# Patient Record
Sex: MALE | Race: WHITE | NOT HISPANIC OR LATINO | Employment: UNEMPLOYED | URBAN - METROPOLITAN AREA
[De-identification: names, ages, dates, MRNs, and addresses within clinical notes are randomized per-mention and may not be internally consistent; named-entity substitution may affect disease eponyms.]

---

## 2018-12-06 ENCOUNTER — HOSPITAL ENCOUNTER (OUTPATIENT)
Dept: MEDSURG UNIT | Facility: HOSPITAL | Age: 43
End: 2018-12-10
Attending: INTERNAL MEDICINE | Admitting: INTERNAL MEDICINE

## 2018-12-06 LAB
ABS NEUT (OLG): 14.96 X10(3)/MCL (ref 2.1–9.2)
ALBUMIN SERPL-MCNC: 3.5 GM/DL (ref 3.4–5)
ALBUMIN/GLOB SERPL: 0.9 RATIO (ref 1.1–2)
ALP SERPL-CCNC: 64 UNIT/L (ref 50–136)
ALT SERPL-CCNC: 15 UNIT/L (ref 12–78)
APPEARANCE, UA: CLEAR
AST SERPL-CCNC: 16 UNIT/L (ref 15–37)
BACTERIA SPEC CULT: ABNORMAL /HPF
BASOPHILS # BLD AUTO: 0.1 X10(3)/MCL (ref 0–0.2)
BASOPHILS NFR BLD AUTO: 0 %
BILIRUB SERPL-MCNC: 0.3 MG/DL (ref 0.2–1)
BILIRUB UR QL STRIP: NEGATIVE
BILIRUBIN DIRECT+TOT PNL SERPL-MCNC: 0.1 MG/DL (ref 0–0.5)
BILIRUBIN DIRECT+TOT PNL SERPL-MCNC: 0.2 MG/DL (ref 0–0.8)
BUN SERPL-MCNC: 3 MG/DL (ref 7–18)
CALCIUM SERPL-MCNC: 8.6 MG/DL (ref 8.5–10.1)
CHLORIDE SERPL-SCNC: 108 MMOL/L (ref 98–107)
CO2 SERPL-SCNC: 24 MMOL/L (ref 21–32)
COLOR UR: ABNORMAL
CREAT SERPL-MCNC: 0.93 MG/DL (ref 0.7–1.3)
EOSINOPHIL # BLD AUTO: 0.1 X10(3)/MCL (ref 0–0.9)
EOSINOPHIL NFR BLD AUTO: 1 %
ERYTHROCYTE [DISTWIDTH] IN BLOOD BY AUTOMATED COUNT: 14.2 % (ref 11.5–17)
GLOBULIN SER-MCNC: 3.7 GM/DL (ref 2.4–3.5)
GLUCOSE (UA): NEGATIVE
GLUCOSE SERPL-MCNC: 69 MG/DL (ref 74–106)
HCT VFR BLD AUTO: 40.9 % (ref 42–52)
HGB BLD-MCNC: 13.2 GM/DL (ref 14–18)
HGB UR QL STRIP: ABNORMAL
KETONES UR QL STRIP: ABNORMAL
LEUKOCYTE ESTERASE UR QL STRIP: ABNORMAL
LYMPHOCYTES # BLD AUTO: 1.8 X10(3)/MCL (ref 0.6–4.6)
LYMPHOCYTES NFR BLD AUTO: 10 %
MCH RBC QN AUTO: 31.8 PG (ref 27–31)
MCHC RBC AUTO-ENTMCNC: 32.3 GM/DL (ref 33–36)
MCV RBC AUTO: 98.6 FL (ref 80–94)
MONOCYTES # BLD AUTO: 1.1 X10(3)/MCL (ref 0.1–1.3)
MONOCYTES NFR BLD AUTO: 6 %
NEUTROPHILS # BLD AUTO: 14.96 X10(3)/MCL (ref 2.1–9.2)
NEUTROPHILS NFR BLD AUTO: 83 %
NITRITE UR QL STRIP: POSITIVE
PH UR STRIP: 8 [PH] (ref 5–9)
PLATELET # BLD AUTO: 301 X10(3)/MCL (ref 130–400)
PMV BLD AUTO: 10.6 FL (ref 9.4–12.4)
POTASSIUM SERPL-SCNC: 3.6 MMOL/L (ref 3.5–5.1)
PROT SERPL-MCNC: 7.2 GM/DL (ref 6.4–8.2)
PROT UR QL STRIP: ABNORMAL
RBC # BLD AUTO: 4.15 X10(6)/MCL (ref 4.7–6.1)
RBC #/AREA URNS HPF: >200 /HPF
SODIUM SERPL-SCNC: 141 MMOL/L (ref 136–145)
SP GR UR STRIP: 1.02 (ref 1–1.03)
SQUAMOUS EPITHELIAL, UA: ABNORMAL
UA WBC MAN: ABNORMAL
UROBILINOGEN UR STRIP-ACNC: 2
WBC # SPEC AUTO: 18.1 X10(3)/MCL (ref 4.5–11.5)

## 2018-12-07 LAB
ABS NEUT (OLG): 9.34 X10(3)/MCL (ref 2.1–9.2)
ALBUMIN SERPL-MCNC: 3 GM/DL (ref 3.4–5)
ALBUMIN/GLOB SERPL: 0.9 RATIO (ref 1.1–2)
ALP SERPL-CCNC: 54 UNIT/L (ref 50–136)
ALT SERPL-CCNC: 13 UNIT/L (ref 12–78)
AST SERPL-CCNC: 18 UNIT/L (ref 15–37)
BASOPHILS # BLD AUTO: 0.1 X10(3)/MCL (ref 0–0.2)
BASOPHILS NFR BLD AUTO: 0 %
BILIRUB SERPL-MCNC: 0.3 MG/DL (ref 0.2–1)
BILIRUBIN DIRECT+TOT PNL SERPL-MCNC: 0.1 MG/DL (ref 0–0.5)
BILIRUBIN DIRECT+TOT PNL SERPL-MCNC: 0.2 MG/DL (ref 0–0.8)
BUN SERPL-MCNC: 3 MG/DL (ref 7–18)
CALCIUM SERPL-MCNC: 7.9 MG/DL (ref 8.5–10.1)
CHLORIDE SERPL-SCNC: 110 MMOL/L (ref 98–107)
CO2 SERPL-SCNC: 23 MMOL/L (ref 21–32)
CREAT SERPL-MCNC: 0.87 MG/DL (ref 0.7–1.3)
EOSINOPHIL # BLD AUTO: 0.2 X10(3)/MCL (ref 0–0.9)
EOSINOPHIL NFR BLD AUTO: 1 %
ERYTHROCYTE [DISTWIDTH] IN BLOOD BY AUTOMATED COUNT: 14.3 % (ref 11.5–17)
GLOBULIN SER-MCNC: 3.4 GM/DL (ref 2.4–3.5)
GLUCOSE SERPL-MCNC: 80 MG/DL (ref 74–106)
HCT VFR BLD AUTO: 36.8 % (ref 42–52)
HGB BLD-MCNC: 12 GM/DL (ref 14–18)
LACTATE SERPL-SCNC: 1.3 MMOL/L (ref 0.4–2)
LYMPHOCYTES # BLD AUTO: 3 X10(3)/MCL (ref 0.6–4.6)
LYMPHOCYTES NFR BLD AUTO: 22 %
MCH RBC QN AUTO: 32.2 PG (ref 27–31)
MCHC RBC AUTO-ENTMCNC: 32.6 GM/DL (ref 33–36)
MCV RBC AUTO: 98.7 FL (ref 80–94)
MONOCYTES # BLD AUTO: 0.9 X10(3)/MCL (ref 0.1–1.3)
MONOCYTES NFR BLD AUTO: 6 %
NEUTROPHILS # BLD AUTO: 9.34 X10(3)/MCL (ref 2.1–9.2)
NEUTROPHILS NFR BLD AUTO: 69 %
PLATELET # BLD AUTO: 292 X10(3)/MCL (ref 130–400)
PMV BLD AUTO: 10.6 FL (ref 9.4–12.4)
POTASSIUM SERPL-SCNC: 4.1 MMOL/L (ref 3.5–5.1)
PROT SERPL-MCNC: 6.4 GM/DL (ref 6.4–8.2)
RBC # BLD AUTO: 3.73 X10(6)/MCL (ref 4.7–6.1)
SODIUM SERPL-SCNC: 142 MMOL/L (ref 136–145)
WBC # SPEC AUTO: 13.5 X10(3)/MCL (ref 4.5–11.5)

## 2018-12-08 LAB
ABS NEUT (OLG): 5.25 X10(3)/MCL (ref 2.1–9.2)
BASOPHILS # BLD AUTO: 0.1 X10(3)/MCL (ref 0–0.2)
BASOPHILS NFR BLD AUTO: 1 %
BUN SERPL-MCNC: 6 MG/DL (ref 7–18)
CALCIUM SERPL-MCNC: 8.9 MG/DL (ref 8.5–10.1)
CHLORIDE SERPL-SCNC: 108 MMOL/L (ref 98–107)
CO2 SERPL-SCNC: 30 MMOL/L (ref 21–32)
CREAT SERPL-MCNC: 0.68 MG/DL (ref 0.7–1.3)
CREAT/UREA NIT SERPL: 8.8
EOSINOPHIL # BLD AUTO: 0.3 X10(3)/MCL (ref 0–0.9)
EOSINOPHIL NFR BLD AUTO: 3 %
ERYTHROCYTE [DISTWIDTH] IN BLOOD BY AUTOMATED COUNT: 14.1 % (ref 11.5–17)
GLUCOSE SERPL-MCNC: 74 MG/DL (ref 74–106)
HCT VFR BLD AUTO: 38.7 % (ref 42–52)
HGB BLD-MCNC: 12.1 GM/DL (ref 14–18)
LYMPHOCYTES # BLD AUTO: 2.3 X10(3)/MCL (ref 0.6–4.6)
LYMPHOCYTES NFR BLD AUTO: 26 %
MCH RBC QN AUTO: 31.1 PG (ref 27–31)
MCHC RBC AUTO-ENTMCNC: 31.3 GM/DL (ref 33–36)
MCV RBC AUTO: 99.5 FL (ref 80–94)
MONOCYTES # BLD AUTO: 0.8 X10(3)/MCL (ref 0.1–1.3)
MONOCYTES NFR BLD AUTO: 10 %
NEUTROPHILS # BLD AUTO: 5.25 X10(3)/MCL (ref 2.1–9.2)
NEUTROPHILS NFR BLD AUTO: 60 %
PLATELET # BLD AUTO: 295 X10(3)/MCL (ref 130–400)
PMV BLD AUTO: 10.7 FL (ref 9.4–12.4)
POTASSIUM SERPL-SCNC: 4 MMOL/L (ref 3.5–5.1)
RBC # BLD AUTO: 3.89 X10(6)/MCL (ref 4.7–6.1)
SODIUM SERPL-SCNC: 141 MMOL/L (ref 136–145)
WBC # SPEC AUTO: 8.8 X10(3)/MCL (ref 4.5–11.5)

## 2018-12-09 LAB
ABS NEUT (OLG): 4.89 X10(3)/MCL (ref 2.1–9.2)
BASOPHILS # BLD AUTO: 0.1 X10(3)/MCL (ref 0–0.2)
BASOPHILS NFR BLD AUTO: 1 %
BUN SERPL-MCNC: 6 MG/DL (ref 7–18)
CALCIUM SERPL-MCNC: 8.7 MG/DL (ref 8.5–10.1)
CHLORIDE SERPL-SCNC: 105 MMOL/L (ref 98–107)
CO2 SERPL-SCNC: 28 MMOL/L (ref 21–32)
CREAT SERPL-MCNC: 0.69 MG/DL (ref 0.7–1.3)
CREAT/UREA NIT SERPL: 8.7
EOSINOPHIL # BLD AUTO: 0.2 X10(3)/MCL (ref 0–0.9)
EOSINOPHIL NFR BLD AUTO: 3 %
ERYTHROCYTE [DISTWIDTH] IN BLOOD BY AUTOMATED COUNT: 13.6 % (ref 11.5–17)
GLUCOSE SERPL-MCNC: 94 MG/DL (ref 74–106)
HCT VFR BLD AUTO: 41.2 % (ref 42–52)
HGB BLD-MCNC: 13.1 GM/DL (ref 14–18)
LYMPHOCYTES # BLD AUTO: 2.2 X10(3)/MCL (ref 0.6–4.6)
LYMPHOCYTES NFR BLD AUTO: 26 %
MCH RBC QN AUTO: 31 PG (ref 27–31)
MCHC RBC AUTO-ENTMCNC: 31.8 GM/DL (ref 33–36)
MCV RBC AUTO: 97.4 FL (ref 80–94)
MONOCYTES # BLD AUTO: 0.8 X10(3)/MCL (ref 0.1–1.3)
MONOCYTES NFR BLD AUTO: 10 %
NEUTROPHILS # BLD AUTO: 4.89 X10(3)/MCL (ref 2.1–9.2)
NEUTROPHILS NFR BLD AUTO: 60 %
PLATELET # BLD AUTO: 298 X10(3)/MCL (ref 130–400)
PMV BLD AUTO: 10.6 FL (ref 9.4–12.4)
POTASSIUM SERPL-SCNC: 4.4 MMOL/L (ref 3.5–5.1)
RBC # BLD AUTO: 4.23 X10(6)/MCL (ref 4.7–6.1)
SODIUM SERPL-SCNC: 138 MMOL/L (ref 136–145)
WBC # SPEC AUTO: 8.2 X10(3)/MCL (ref 4.5–11.5)

## 2018-12-10 LAB
ABS NEUT (OLG): 5.06 X10(3)/MCL (ref 2.1–9.2)
BASOPHILS # BLD AUTO: 0.1 X10(3)/MCL (ref 0–0.2)
BASOPHILS NFR BLD AUTO: 1 %
EOSINOPHIL # BLD AUTO: 0.2 X10(3)/MCL (ref 0–0.9)
EOSINOPHIL NFR BLD AUTO: 2 %
ERYTHROCYTE [DISTWIDTH] IN BLOOD BY AUTOMATED COUNT: 13.2 % (ref 11.5–17)
FINAL CULTURE: NO GROWTH
HCT VFR BLD AUTO: 44.2 % (ref 42–52)
HGB BLD-MCNC: 14.2 GM/DL (ref 14–18)
LYMPHOCYTES # BLD AUTO: 2.1 X10(3)/MCL (ref 0.6–4.6)
LYMPHOCYTES NFR BLD AUTO: 26 %
MCH RBC QN AUTO: 31.1 PG (ref 27–31)
MCHC RBC AUTO-ENTMCNC: 32.1 GM/DL (ref 33–36)
MCV RBC AUTO: 96.7 FL (ref 80–94)
MONOCYTES # BLD AUTO: 0.8 X10(3)/MCL (ref 0.1–1.3)
MONOCYTES NFR BLD AUTO: 9 %
NEUTROPHILS # BLD AUTO: 5.06 X10(3)/MCL (ref 2.1–9.2)
NEUTROPHILS NFR BLD AUTO: 62 %
PLATELET # BLD AUTO: 336 X10(3)/MCL (ref 130–400)
PMV BLD AUTO: 10.7 FL (ref 9.4–12.4)
RBC # BLD AUTO: 4.57 X10(6)/MCL (ref 4.7–6.1)
WBC # SPEC AUTO: 8.2 X10(3)/MCL (ref 4.5–11.5)

## 2018-12-13 LAB
FINAL CULTURE: NORMAL
FINAL CULTURE: NORMAL

## 2020-06-14 PROBLEM — Z97.8 FOLEY CATHETER IN PLACE: Status: ACTIVE | Noted: 2020-06-14

## 2020-06-14 PROBLEM — F31.32 BIPOLAR AFFECTIVE DISORDER, CURRENTLY DEPRESSED, MODERATE: Status: ACTIVE | Noted: 2020-06-14

## 2020-06-14 PROBLEM — F33.3 MDD (MAJOR DEPRESSIVE DISORDER), RECURRENT, SEVERE, WITH PSYCHOSIS: Status: RESOLVED | Noted: 2020-06-14 | Resolved: 2020-06-14

## 2020-06-14 PROBLEM — R31.21 ASYMPTOMATIC MICROSCOPIC HEMATURIA: Status: ACTIVE | Noted: 2020-06-14

## 2020-06-14 PROBLEM — F33.3 MDD (MAJOR DEPRESSIVE DISORDER), RECURRENT, SEVERE, WITH PSYCHOSIS: Status: ACTIVE | Noted: 2020-06-14

## 2020-06-14 PROBLEM — Z86.59 HISTORY OF POSTTRAUMATIC STRESS DISORDER (PTSD): Status: ACTIVE | Noted: 2020-06-14

## 2020-06-14 PROBLEM — F11.20 OPIOID USE DISORDER, SEVERE, DEPENDENCE: Status: ACTIVE | Noted: 2020-06-14

## 2020-06-14 PROBLEM — N31.2 BLADDER ATONY: Status: ACTIVE | Noted: 2020-06-14

## 2020-11-21 PROBLEM — R45.851 SUICIDE IDEATION: Status: ACTIVE | Noted: 2020-11-21

## 2020-11-22 PROBLEM — R11.0 NAUSEA: Status: ACTIVE | Noted: 2020-11-22

## 2020-11-22 PROBLEM — F19.10 SUBSTANCE ABUSE: Status: ACTIVE | Noted: 2020-11-22

## 2022-04-30 NOTE — DISCHARGE SUMMARY
"   Patient:   Paige Overton             MRN: [id number]            FIN: [id number]               Age:   43 years     Sex:  Male     :  1975   Associated Diagnoses:   None   Author:   Alis Daniel MD      Basic Information     Chief Complaint   Abdominal pain, flank pain and dysuria       History of Present Illness   Mr. Overton is a 43 year old male with a medical hsitory of depression, anxiety, ADHD, Neuropathy, and nephrolithiasis with lithotripsy and stent placed/removed 2016 in [place]; no longer taking any home medications and is not followed by a PCP. He presented to [facility] ER with complaints of feeling bloated with abdominal pain, radiating to his right flank and right testicle. Associated with urinary hesitancy, dysuria, foul smelling urine, and "pink" urine over the last 3-4 days. Reports he was trying to "tough it out". Tmax of 102 at home, along with chills and night sweats over the last 24 hours. Intermittent nausea, denies vomitting. No other complaints.   Initial ER VS: /98, , respirations 20, oral temp 37.6, and SPO2 98% on RA.  CMP unremarkable, lactic acid 1.3, W BC 18.1 with improvement to 13.5 this morning.  UA with no WBCs, 2+ leukocytes, positive nitrites, greater than 30 RBCs, 3+ blood, 1+ ketones, 3+ protein.  CT abdomen was unremarkable.  He was given Percocet 10, Rocephin 1 g IV, a total of 5 mg of IV Dilaudid, morphine, Toradol 30, Zofran, and IVF while in the ER.  He's been admitted to the hospitalist services for further evaluation and management of sepsis secondary to urinary tract infection.  This admission is his 14th visit and his 7th CT scan over the last 4 months for the same complaints. He over the last 4 months he has been treated for renal colic, hematuria, flank pain, and pyelonephritis/UTI  With Percocet, Toradol, Tramodol, Naproxen, Levaquin, Flomax, Zofran. He was prescribed Levaquin & Cipro in August and Macrobid in September. Also of note, he " has left AMA 3 times from the ER over the last 4 months.  Renal ultrasound was unremarkable.  He was seen by urology this admit and no acute issues were found.  Urology recommended for him to keep his outpatient appointment with [facility] urology on 12-18-18.  He was treated with rocephin the entire hospital stay.  Urine cultures are negative.      Review of Systems   Except as documented, all other systems reviewed and negative.          Health Status   Allergies:    Allergic Reactions (Selected)  No Known Allergies,    Allergies (1) Active Reaction  No Known Allergies None Documented   Current medications:  (Selected)   Inpatient Medications  Ordered  Dilaudid: 4 mg, form: Tab, Oral, q4hr PRN for pain, severe, first dose 12/10/18 15:12:00 CST  Flomax 0.4 mg oral capsule: 0.4 mg, form: Cap, Oral, BID, first dose 12/10/18 14:10:00 CST, NOW  NS (0.9% Sodium Chloride) Infusion 1,000 mL: 1,000 mL, 1,000 mL, IV, 150 mL/hr, start date 12/10/18 14:09:00 CST  Phenergan: 12.5 mg, form: Injection, IV Push, q4hr PRN for nausea, first dose 12/07/18 9:21:00 CST  Relistor 12 mg/0.6 mL subcutaneous solution: 12 mg, form: Injection, Subcutaneous, Every other day, first dose 12/10/18 9:00:00 CST  Rocephin (for IVPB): 1,000 mg, form: Infusion, IV Piggyback, q24hr, Infuse over: 30 minute(s), first dose 12/07/18 9:00:00 CST  Toradol: 30 mg, form: Injection, IV Push, q6hr PRN for pain, Order duration: 5 day(s), first dose 12/07/18 9:56:00 CST, stop date 12/12/18 9:55:00 CST  Zofran 2 mg/mL injectable solution: 4 mg, form: Injection, IV Push, q4hr PRN for nausea, first dose 12/07/18 9:20:00 CST  Prescriptions  Prescribed  Flomax 0.4 mg oral capsule: 0.4 mg = 1 cap(s), Oral, BID, # 60 cap(s), 1 Refill(s)  HYDROmorphone 4 mg oral tablet: 4 mg = 1 tab(s), Oral, q6hr, PRN PRN for pain, # 12 tab(s), 0 Refill(s)  Zofran 4 mg oral tablet: 4 mg = 1 tab(s), Oral, q6hr, PRN PRN nausea/vomiting, # 30 tab(s), 0 Refill(s),    Medications (8)  Active  Scheduled: (3)  cefTRIAXone  1,000 mg 50 mL, IV Piggyback, q24hr  methylnaltrexone 12 mg vial  12 mg 0.6 mL, Subcutaneous, Every other day  tamsulosin 0.4 mg Cap  0.4 mg 1 cap(s), Oral, BID  Continuous: (1)  sodium chloride 0.9% 1,000 mL  1,000 mL, IV, 150 mL/hr  PRN: (4)  hydromorphone 2 mg Tab UD  4 mg 2 tab(s), Oral, q4hr  ketorolac 30 mg/mL Sol  30 mg 1 mL, IV Push, q6hr  ondansetron 2 mg/mL inj - 2mL  4 mg 2 mL, IV Push, q4hr  promethazine 25 mg/mL Inj  12.5 mg 0.5 mL, IV Push, q4hr   Problem list:    All Problems  Knowledge deficit / SNOMED CT 1878679284 / Confirmed  Tobacco user / SNOMED CT 504460958 / Confirmed,    Active Problems (2)  Knowledge deficit   Tobacco user       Physical Examination      Vital Signs (last 24 hrs)_____  Last Charted___________  Temp Oral     36.9 DegC  (DEC 10 11:00)  Heart Rate Peripheral   76 bpm  (DEC 10 11:00)  Resp Rate         18 br/min  (DEC 10 11:00)  SBP      121 mmHg  (DEC 10 11:00)  DBP      70 mmHg  (DEC 10 11:00)  SpO2      95 %  (DEC 10 11:00)     General:  Alert and oriented, No acute distress.    Cognition and Speech:  Oriented, Speech clear and coherent.    HENT:  Normocephalic, Normal hearing, Oral mucosa is moist.    Neck:  Supple.    Respiratory:  Lungs are clear to auscultation, Respirations are non-labored, Breath sounds are equal.    Cardiovascular:  Normal rate, Regular rhythm, No edema.    Gastrointestinal:  Soft, Non-tender, Non-distended, Normal bowel sounds, Gaurding.    Integumentary:  Warm, Dry, Intact.    Musculoskeletal:  Normal strength, No tenderness, No swelling.    Neurologic:  Alert, Oriented, Normal sensory, No focal deficits.    Psychiatric:  Cooperative, Appropriate mood & affect.           Review / Management   Results review:     Labs (Last four charted values)  WBC                  8.2 (DEC 10) 8.2 (DEC 09) 8.8 (DEC 08) H 13.5 (DEC 07)   Hgb                  14.2 (DEC 10) L 13.1 (DEC 09) L 12.1 (DEC 08) L 12.0 (DEC 07)   Hct                   44.2 (DEC 10) L 41.2 (DEC 09) L 38.7 (DEC 08) L 36.8 (DEC 07)   Plt                  336 (DEC 10) 298 (DEC 09) 295 (DEC 08) 292 (DEC 07)   Na                   138 (DEC 09) 141 (DEC 08) 142 (DEC 07) 141 (DEC 06)   K                    4.4 (DEC 09) 4.0 (DEC 08) 4.1 (DEC 07) 3.6 (DEC 06)   CO2                  28.0 (DEC 09) 30.0 (DEC 08) 23.0 (DEC 07) 24.0 (DEC 06)   Cl                   105 (DEC 09) H 108 (DEC 08) H 110 (DEC 07) H 108 (DEC 06)   Cr                   L 0.69 (DEC 09) L 0.68 (DEC 08) 0.87 (DEC 07) 0.93 (DEC 06)   BUN                  L 6.0 (DEC 09) L 6.0 (DEC 08) L 3.0 (DEC 07) L 3.0 (DEC 06)   Glucose Random       94 (DEC 09) 74 (DEC 08) 80 (DEC 07) L 69 (DEC 06) .    Radiology results   Rad Results Last 72 Hrs   Accession: [id number]  Order: US Retroperitoneum Complete  Report Dt/Tm: 12/07/2018 17:39  Report:   Clinical History  Hematuria     Technique  US of the kidneys.  Images obtained in grayscale and color.     Comparison  No prior imaging available for comparison.     Findings  The RIGHT KIDNEY is normal in size, measuring 10.2 x 5.5 x 5 point cm.  There is no evidence of hydronephrosis.  No solid masses are identified.  There are no calculi seen.     The LEFT KIDNEY is normal in size, measuring 10.3 x 6.5 x 5.9 cm.  There is no evidence of hydronephrosis.  No solid masses are identified.  There are no calculi seen.     The bladder is unremarkable.     Impression  No acute sonographic abnormality.    , Rad Results Last 7 days   Accession: [id number]  Order: US Retroperitoneum Complete  Report Dt/Tm: 12/07/2018 17:39  Report:   Clinical History  Hematuria     Technique  US of the kidneys.  Images obtained in grayscale and color.     Comparison  No prior imaging available for comparison.     Findings  The RIGHT KIDNEY is normal in size, measuring 10.2 x 5.5 x 5 point cm.  There is no evidence of hydronephrosis.  No solid masses are identified.  There are no calculi seen.     The  LEFT KIDNEY is normal in size, measuring 10.3 x 6.5 x 5.9 cm.  There is no evidence of hydronephrosis.  No solid masses are identified.  There are no calculi seen.     The bladder is unremarkable.     Impression  No acute sonographic abnormality.      Accession: [id number]  Order: CT Abdomen and Pelvis W/O Contrast  Report Dt/Tm: 12/06/2018 21:49  Report:   EXAMINATION: CT the abdomen and pelvis without contrast     EXAMINATION DATE: 12/6/2018     COMPARISON: CT the abdomen pelvis from September 22, 2018     TECHNIQUE: Multiple cross-sectional images of the abdomen and pelvis  were obtained without the use of intravenous contrast. Coronal and  sagittal reformatted images were obtained.     CLINICAL HISTORY: Right abdominal pain and right flank pain     FINDINGS:      Dependent atelectatic changes lungs. 4 mm pulmonary nodule within the  right lower lobe. The heart size is within normal limits.     Evaluation of the hollow and solid viscera is limited secondary to  technique.     The liver, spleen, adrenals, kidneys, and pancreas are normal. The  gallbladder is present. No hydronephrosis or calculus identified.     Small hiatal hernia. The small bowel is of normal caliber. The colon  is also normal caliber with a large stool burden within the rectum.  The appendix is not identified.     The bladder and prostate are normal. No free fluid in the abdomen or  pelvis. No lymphadenopathy. Atheromatous disease of the abdominal  aorta is noted. A left-sided IVC duplication noted.     No suspicious osseous lesions. The soft tissues are normal.     IMPRESSION:      No acute process of the abdomen or pelvis. Secondary findings as  above.     Please note this is the patient's 7 CT of the abdomen and pelvis since  July 31, 2018    , Rad Results Last 10 Days   Accession: [id number]  Order: US Retroperitoneum Complete  Report Dt/Tm: 12/07/2018 17:39  Report:   Clinical History  Hematuria     Technique  US of the kidneys.   Images obtained in grayscale and color.     Comparison  No prior imaging available for comparison.     Findings  The RIGHT KIDNEY is normal in size, measuring 10.2 x 5.5 x 5 point cm.  There is no evidence of hydronephrosis.  No solid masses are identified.  There are no calculi seen.     The LEFT KIDNEY is normal in size, measuring 10.3 x 6.5 x 5.9 cm.  There is no evidence of hydronephrosis.  No solid masses are identified.  There are no calculi seen.     The bladder is unremarkable.     Impression  No acute sonographic abnormality.      Accession: [id number]  Order: CT Abdomen and Pelvis W/O Contrast  Report Dt/Tm: 12/06/2018 21:49  Report:   EXAMINATION: CT the abdomen and pelvis without contrast     EXAMINATION DATE: 12/6/2018     COMPARISON: CT the abdomen pelvis from September 22, 2018     TECHNIQUE: Multiple cross-sectional images of the abdomen and pelvis  were obtained without the use of intravenous contrast. Coronal and  sagittal reformatted images were obtained.     CLINICAL HISTORY: Right abdominal pain and right flank pain     FINDINGS:      Dependent atelectatic changes lungs. 4 mm pulmonary nodule within the  right lower lobe. The heart size is within normal limits.     Evaluation of the hollow and solid viscera is limited secondary to  technique.     The liver, spleen, adrenals, kidneys, and pancreas are normal. The  gallbladder is present. No hydronephrosis or calculus identified.     Small hiatal hernia. The small bowel is of normal caliber. The colon  is also normal caliber with a large stool burden within the rectum.  The appendix is not identified.     The bladder and prostate are normal. No free fluid in the abdomen or  pelvis. No lymphadenopathy. Atheromatous disease of the abdominal  aorta is noted. A left-sided IVC duplication noted.     No suspicious osseous lesions. The soft tissues are normal.     IMPRESSION:      No acute process of the abdomen or pelvis. Secondary findings  as  above.     Please note this is the patient's 7 CT of the abdomen and pelvis since  2018          Impression and Plan         Chronic/ Recurrent: Hematuria and reports of right flank pain   -_CT of abdomen and renal Ultrasound are unremarkable   -Urine culture and blood culture are negative   -He was afebrile the entire hospital visit   -He was evaluated by Urology during his hospital stay and did not find any acute issues   -He will followup with [facility] urology on 18    Nausea: resolved      HX: Depression, Anxiety, Neuropathy  - No longer on medications, no PCP       Discharge to home in improved condition and on a regular diet with plenty of fluids  Discharge activity: as tolerated  Discharge meds: see discharge med rec  Total time with discharge plannin minutes

## 2022-04-30 NOTE — CONSULTS
DATE OF CONSULTATION:  12/08/2018    ATTENDING PHYSICIAN:  Patrick Mederos MD  CONSULTING PHYSICIAN:  Ezio Cloud MD    REASON FOR CONSULTATION:  Right flank and abdominal pain, and hematuria.    HISTORY OF PRESENT ILLNESS:  This is a 43-year-old male who apparently has a history of nephrolithiasis and had a procedure in [place] in 2016 involving lithotripsy and stent placement.  He has been in the emergency room 14 times here and at [facility] for the same complaint which is flank pain and right lower quadrant abdominal pain.  He has had 7 CT scans in the last 6 months, all of which show no urinary tract dilation or stones.  He presented again 2 days ago to the ER with the same complaints, he was admitted.  He had a CT scan, again, showing the same findings.    PHYSICAL EXAMINATION:  VITAL SIGNS:  Temperature 36.7, blood pressure 132/81, pulse 61.   GENERAL:  He is awake and alert, very jittery.   CHEST:  Clear.   CARDIOVASCULAR:  Regular rate and rhythm.   ABDOMEN:  Soft, nondistended, mild tenderness in the right lower quadrant.  No rebound.   GENITOURINARY:  Reveals a nonpalpable bladder.  He has CVA tenderness.   SKIN:  He has tattoos everywhere from his neck, all over his trunk, both arms, and abdomen.    LABORATORY DATA:  Serum chemistries revealed sodium 141, potassium 4, chloride 108, bicarb 30, BUN 6, creatinine 0.68.  CBC reveals white blood cell count 8.8, hemoglobin 12.1, hematocrit 38.7, platelets 295.  Urinalysis, on admission, greater than 200 red blood cells per high-power field.    IMPRESSION:    1. A 43-year-old male with frequent emergency room visits and imaging studies apparently with hematuria.    2. All of his studies showed no evidence of any urinary tract stones or hydronephrosis.    3. He does have a urology appointment at [facility], Monday, December 18th, at 1:40, and I suggested to     him that he keep that appointment.    4. I see nothing to do acutely.  He can get a cystoscopy  through [facility].        ______________________________  MD BONNIE Whitten/TEETEE  DD:  12/08/2018  Time:  08:47AM  DT:  12/08/2018  Time:  09:10AM  Job #:  [id number]

## 2022-04-30 NOTE — H&P
"   Patient:   Paige Overton             MRN: [id number]            FIN: [id number]               Age:   43 years     Sex:  Male     :  1975   Associated Diagnoses:   None   Author:   Mari Godinez MD      Basic Information   Time Seen:  Date & Time 2018 08:44:00.    Source of history:  Self, Medical record.    History limitation:  None.    Advance directive:  Full code.    Provider information/ cc:  Primary care:  None, None.       Chief Complaint   Abdominal pain, flank pain and dysuria       History of Present Illness   Mr. Overton is a 43 year old male with a medical hsitory of depression, anxiety, ADHD, Neuropathy, and nephrolithiasis with lithotripsy and stent placed/removed 2016 in [place]; no longer taking any home medications and is not followed by a PCP. He presented to [facility] ER with complaints of feeling bloated with abdominal pain, radiating to his right flank and right testicle. Associated with urinary hesitancy, dysuria, foul smelling urine, and "pink" urine over the last 3-4 days. Reports he was trying to "tough it out". Tmax of 102 at home, along with chills and night sweats over the last 24 hours. Intermittent nausea, denies vomitting. No other complaints.   Initial ER VS: /98, , respirations 20, oral temp 37.6, and SPO2 98% on RA.  CMP unremarkable, lactic acid 1.3, W BC 18.1 with improvement to 13.5 this morning.  UA with no WBCs, 2+ leukocytes, positive nitrites, greater than 30 RBCs, 3+ blood, 1+ ketones, 3+ protein.  CT abdomen was unremarkable.  He was given Percocet 10, Rocephin 1 g IV, a total of 5 mg of IV Dilaudid, morphine, Toradol 30, Zofran, and IVF while in the ER.  He's been admitted to the hospitalist services for further evaluation and management of sepsis secondary to urinary tract infection.  This admission is his 14th visit and his 7th CT scan over the last 4 months for the same complaints. He over the last 4 months he has been treated for renal " "colic, hematuria, flank pain, and pyelonephritis/UTI  With Percocet, Toradol, Tramodol, Naproxen, Levaquin, Flomax, Zofran. He was prescribed Levaquin & Cipro in August and Macrobid in September. Also of note, he has left AMA 3 times from the ER over the last 4 months. He is asking me to change his pain medication to Dilaudid because "that the only thing that works for me"; I informed him that he Toradol/Norco ordered and Morphine for breakthrough pain.      Review of Systems   Except as documented, all other systems reviewed and negative.       Health Status   Allergies:    Allergic Reactions (Selected)  No Known Allergies,    Allergies (1) Active Reaction  No Known Allergies None Documented   Current medications:  (Selected)   Inpatient Medications  Ordered  IVF Normal Saline NS Bolus 1000ml 1,000 mL: 1,000 mL, 1,000 mL, IV, 1,000 mL/hr, start date 12/06/18 21:34:00 CST  NS (0.9% Sodium Chloride) Infusion 1,000 mL: 1,000 mL, 1,000 mL, IV, 125 mL/hr, start date 12/07/18 0:09:00 CST  Norco 5 mg-325 mg oral tablet: 1 tab(s), form: Tab, Oral, q4hr PRN for pain, first dose 12/07/18 2:50:00 CST  morphine 4 mg/mL preservative-free intravenous solution: 2 mg, form: Injection, IV Push, q4hr PRN for pain, severe, first dose 12/07/18 2:38:00 CST, ( > 7 on pain scale)  Prescriptions  Prescribed  Flomax 0.4 mg oral capsule: 0.4 mg = 1 cap(s), Oral, Daily, # 15 cap(s), 0 Refill(s), Pharmacy: [email or web address] - [place], [place]  Flomax 0.4 mg oral capsule: 0.4 mg = 1 cap(s), Oral, Daily, # 30 cap(s), 0 Refill(s)  Mobic 7.5 mg oral tablet: 7.5 mg = 1 tab(s), Oral, BID, PRN PRN pain, mild, # 14 tab(s), 0 Refill(s)  Documented Medications  Documented  Atomoxetine Hcl 40 Mg Capsule: 40 mg = 1 cap(s), Oral, Daily  Atomoxetine Hcl 80 Mg Capsule: 80 mg = 1 cap(s), Oral, Daily  Divalproex Sod Er 500 Mg Tab: 1000 mg = 2 tab(s), Oral, BID  Doxepin 10 Mg Capsule: 10 mg = 1 cap(s), Oral, Daily  Gabapentin 400 Mg Capsule: 400 mg = 1 cap(s), " Oral, TID  Gabapentin 600 Mg Tablet: 600 mg = 1 tab(s), Oral, TID  Gabapentin 800 Mg Tablet: 800 mg = 1 tab(s), Oral, TID  Hydrocodone-Acetaminophen 5-325 M tab(s), Oral, q6hr  Ketorolac 10 Mg Tablet: 10 mg = 1 tab(s), Oral, TID  Lithium Carbonate 300 Mg Tab: 600 mg = 2 tab(s), Oral, Daily  Olanzapine 2.5 Mg Tablet: 2.5 mg = 1 tab(s), Oral, qPM  Olanzapine 5 Mg Tablet: 5 mg = 1 tab(s), Oral, qAM  Oxycodone-Acetaminophen  Mg T:   Prazosin 5 Mg Capsule: 5 mg = 1 cap(s), Oral, qPM  Propranolol Er 80 Mg Capsule: 80 mg = 1 cap(s), Oral, Daily  Quetiapine Fumarate 300 Mg Tab: 300 mg = 1 tab(s), Oral, qPM  Venlafaxine Hcl Er 150 Mg Cap: 150 mg = 1 cap(s), Oral, qAM,    Medications (4) Active  Scheduled: (0)  Continuous: (2)  sodium chloride 0.9% 1,000 mL  1,000 mL, IV, 1,000 mL/hr  sodium chloride 0.9% 1,000 mL  1,000 mL, IV, 125 mL/hr  PRN: (2)  hydrocodone 5mg/APAP 325 mg  1 tab(s), Oral, q4hr  morphine 4 mg/mL preservative-free Soln  2 mg 0.5 mL, IV Push, q4hr      Histories     Past Medical History: Depression, Anxiety, ADHD, Neuropathy, Nephrolithiasis, Tobacco Use   Past Surgical History: Lithrotripsy and Ureter stent with removal .   Family History: Denies   Social History:  No alcohol, tobacco or illicit drug use.       Physical Examination      Vital Signs (last 24 hrs)_____  Last Charted___________  Temp Oral     36.9 DegC  (DEC 07 08:)  Heart Rate Peripheral   99 bpm  (DEC 07 08:)  Resp Rate         19 br/min  (DEC 07 01:05)  SBP      127 mmHg  (DEC 07 08:11)  DBP      89 mmHg  (DEC 07 08:)  SpO2      95 %  (DEC 07 08:)   General:  Alert and oriented, No acute distress.    Cognition and Speech:  Oriented, Speech clear and coherent.    HENT:  Normocephalic, Normal hearing, Oral mucosa is moist.    Neck:  Supple.    Respiratory:  Lungs are clear to auscultation, Respirations are non-labored, Breath sounds are equal.    Cardiovascular:  Normal rate, Regular rhythm, No edema.     Gastrointestinal:  Soft, Non-tender, Non-distended, Normal bowel sounds, Gaurding. .    Integumentary:  Warm, Dry, Intact.    Musculoskeletal:  Normal strength, No tenderness, No swelling.    Neurologic:  Alert, Oriented, Normal sensory, No focal deficits.    Psychiatric:  Cooperative, Appropriate mood & affect.       Review / Management   Laboratory Results   Today's Lab Results : PowerNote Discrete Results   12/7/2018 4:43 CST       WBC                       13.5 x10(3)/mcL  HI                             RBC                       3.73 x10(6)/mcL  LOW                             Hgb                       12.0 gm/dL  LOW                             Hct                       36.8 %  LOW                             Platelet                  292 x10(3)/mcL                             MCV                       98.7 fL  HI                             MCH                       32.2 pg  HI                             MCHC                      32.6 gm/dL  LOW                             RDW                       14.3 %                             MPV                       10.6 fL                             Abs Neut                  9.34 x10(3)/mcL  HI                             Neutro Auto               69 %  NA                             Lymph Auto                22 %  NA                             Mono Auto                 6 %  NA                             Eos Auto                  1 %  NA                             Abs Eos                   0.2 x10(3)/mcL                             Basophil Auto             0 %  NA                             Abs Neutro                9.34 x10(3)/mcL  HI                             Abs Lymph                 3.0 x10(3)/mcL                             Abs Mono                  0.9 x10(3)/mcL                             Abs Baso                  0.1 x10(3)/mcL                             Sodium Lvl                142 mmol/L                             Potassium Lvl              4.1 mmol/L                             Chloride                  110 mmol/L  HI                             CO2                       23.0 mmol/L                             Calcium Lvl               7.9 mg/dL  LOW                             Glucose Lvl               80 mg/dL                             BUN                       3.0 mg/dL  LOW                             Creatinine                0.87 mg/dL                             eGFR-AA                   >60 mL/min/1.73 m2  NA                             eGFR-BENJAMIN                  >60 mL/min/1.73 m2  NA                             Bili Total                0.3 mg/dL                             Bili Direct               0.10 mg/dL                             Bili Indirect             0.20 mg/dL                             AST                       18 unit/L                             ALT                       13 unit/L                             Alk Phos                  54 unit/L                             Total Protein             6.4 gm/dL                             Albumin Lvl               3.00 gm/dL  LOW                             Globulin                  3.40 gm/dL                             A/G Ratio                 0.9 ratio  LOW                             Lactic Acid Lvl           1.3 mmol/L    12/6/2018 20:07 CST      UA Appear                 Clear  (Modified)                            UA Color                  Red  (Modified)                            UA Spec Grav              1.020  (Modified)                            UA Bili                   Negative  (Modified)                            UA pH                     8.0  (Modified)                            UA Urobilinogen           2.0  (Modified)                            UA Blood                  3+  (Modified)                            UA Glucose                Negative  (Modified)                            UA Ketones                1+  (Modified)                            UA  Protein                3+  (Modified)                            UA Nitrite                Positive  (Modified)                            UA Leuk Est               2+  (Modified)                            UA WBC Man                None Seen  (Modified)                            UA RBC                    >200 /HPF  (Modified)                            UA Bacteria               None Seen /HPF  (Modified)                            UA Squam Epithelial       None Seen  (Modified)       Radiology results   Rad Results (ST)   Order: CT Abdomen and Pelvis W/O Contrast  Report Dt/Tm: 12/06/2018 21:49  FINDINGS:      Dependent atelectatic changes lungs. 4 mm pulmonary nodule within the  right lower lobe. The heart size is within normal limits.     Evaluation of the hollow and solid viscera is limited secondary to  technique.     The liver, spleen, adrenals, kidneys, and pancreas are normal. The  gallbladder is present. No hydronephrosis or calculus identified.     Small hiatal hernia. The small bowel is of normal caliber. The colon  is also normal caliber with a large stool burden within the rectum.  The appendix is not identified.     The bladder and prostate are normal. No free fluid in the abdomen or  pelvis. No lymphadenopathy. Atheromatous disease of the abdominal  aorta is noted. A left-sided IVC duplication noted.     No suspicious osseous lesions. The soft tissues are normal.     IMPRESSION:   No acute process of the abdomen or pelvis. Secondary findings as  above.      Impression and Plan     Sepsis - likely s/t UTI - improved  - IVF, IV Abx  - Urine Culture on 12/7/2018= pending  - Blood Cultures x 2 on 12/7/2018 = pending   - Labs in am     Urinary Tract Infection with Hematuria - POA; HX: Nephrolithiasis with lithotripsy/stent with removal  - CT Abdomen unremarkable   - Rocephin 1 gm IV daily - Day #2  - PRN analgesics  - NS @ 125ml/hr   - Urine Culture pending     Nausea - improved  - Regular diet per  patients request   - PRN antiemetics     HX: Depression, Anxiety, Neuropathy  - No longer on medications, no PCP     Code status: Full Code  DVT prophylaxis: SCDs bilaterally   Admission time 72 minutes.     Rupali GRIJALVA FNP-C, reviewed and discussed the case with Dr Mari Godinez